# Patient Record
Sex: FEMALE | Race: WHITE
[De-identification: names, ages, dates, MRNs, and addresses within clinical notes are randomized per-mention and may not be internally consistent; named-entity substitution may affect disease eponyms.]

---

## 2017-08-18 ENCOUNTER — HOSPITAL ENCOUNTER (OUTPATIENT)
Dept: HOSPITAL 80 - FIMAGING | Age: 52
End: 2017-08-18
Attending: INTERNAL MEDICINE
Payer: COMMERCIAL

## 2017-08-18 ENCOUNTER — HOSPITAL ENCOUNTER (OUTPATIENT)
Dept: HOSPITAL 80 - FIMAGING | Age: 52
End: 2017-08-18
Attending: PHYSICAL MEDICINE & REHABILITATION
Payer: COMMERCIAL

## 2017-08-18 DIAGNOSIS — M51.36: ICD-10-CM

## 2017-08-18 DIAGNOSIS — M43.16: ICD-10-CM

## 2017-08-18 DIAGNOSIS — M54.41: Primary | ICD-10-CM

## 2017-08-18 DIAGNOSIS — Z12.31: Primary | ICD-10-CM

## 2017-08-18 PROCEDURE — G0202 SCR MAMMO BI INCL CAD: HCPCS

## 2017-08-19 ENCOUNTER — HOSPITAL ENCOUNTER (EMERGENCY)
Dept: HOSPITAL 80 - FED | Age: 52
Discharge: HOME | End: 2017-08-19
Payer: COMMERCIAL

## 2017-08-19 VITALS — SYSTOLIC BLOOD PRESSURE: 148 MMHG | RESPIRATION RATE: 16 BRPM | HEART RATE: 78 BPM | DIASTOLIC BLOOD PRESSURE: 88 MMHG

## 2017-08-19 VITALS — OXYGEN SATURATION: 94 % | TEMPERATURE: 98.1 F

## 2017-08-19 DIAGNOSIS — I10: ICD-10-CM

## 2017-08-19 DIAGNOSIS — M54.16: Primary | ICD-10-CM

## 2017-08-19 NOTE — EDPHY
H & P


Time Seen by Provider: 08/19/17 12:50


HPI/ROS: 





CHIEF COMPLAINT:  Numbness in right side of the groin





HISTORY OF PRESENT ILLNESS:  Patient injured her back on August 6 12 dead 

lifting.  She dropped the bar and stopped her cross fit class and went to 

target and then noticed she had burning pain in the right side of her hip and 

down her leg.  She was treated with a Medrol Dosepak which was finished on 

August 15th.  She is currently on gabapentin, both of those prescribed by Dr. Iniguez.  She is also taking meloxicam.  Today she noticed that she had more 

numbness and tingling on the right side of her vagina and groin as well as 

feeling like it took more effort to empty her bladder.


She feels a little bit weak but she feels like the pain and weakness have 

improved since her initial event.


No incontinence of bladder or bowel.  No back pain now.  No fever or chills.





REVIEW OF SYSTEMS:


Eye: no change in vision


ENT: no sore throat


Cardiac: no chest pain or syncope


Pulmonary: no cough or SOB


Abdomen: no vomiting, diarrhea, abdominal pain


Musculoskeletal:  HPI


Skin: no rash


Neuro:  HPI


Constitutional: no fever


:  HPI, no dysuria or hematuria





A comprehensive 10 point review of systems is otherwise negative aside from 

elements mentioned in the history of present illness.





PAST MEDICAL HISTORY:  Includes adrenal hyperplasia on chronic steroids, 

hypertension.





Social history:  Physician, psychiatrist.





General Appearance: Alert and conversant, cooperative.


Eyes: No scleral  icterus. 


ENT, Mouth: Normal mucous membranes.


Respiratory: Normal respiratory effort, breath sounds equal, lungs are clear to 

auscultation.


Cardiovascular:  Regular rate and rhythm.


Gastrointestinal:  Abdomen is soft and non tender.


Neurological: Alert and oriented x3.   Normally conversant.  Toes downgoing 

bilaterally.  Patellar reflexes 2+ on the left and 0-1 on the right.  Ankle 

reflexes 1+ bilaterally. No clonus.  She can squat by the bedside and stand 

without assistance.  Negative straight leg raising bilaterally. She does have 

preserved sensation to light touch in both lower extremities.


Skin: Warm and dry, no rashes.


Musculoskeletal:  No midline spinal tenderness and no sciatic notch tenderness.

  


Psychiatric: Not agitated.





Emergency Department course/MDM:


Plan for MRI to evaluate for potential surgical lesion.  Discussed and 

consented.  Has claustrophobia so 1 mg oral Ativan requested by her.





1550:  Results discussed, nonsurgical, continue current therapy regimen.  

Patient says her strength is improving and her pain is less over the past 

several days.  Will follow up with her physiatrist and PCP, spine referral if 

symptoms worsen.


Smoking Status: Never smoked


Constitutional: 


 Initial Vital Signs











Temperature (C)  36.7 C   08/19/17 11:04


 


Heart Rate  77   08/19/17 11:04


 


Respiratory Rate  14   08/19/17 11:04


 


Blood Pressure  157/78 H  08/19/17 11:04


 


O2 Sat (%)  94   08/19/17 11:04








 











O2 Delivery Mode               Room Air














Allergies/Adverse Reactions: 


 





Penicillins Allergy (Severe, Verified 05/24/10 14:31)


 Hives








Home Medications: 














 Medication  Instructions  Recorded


 


PREDNISONE  11/02/09


 


Spironolactone  11/02/09


 


Aspirin  05/30/10


 


COZAAR  05/30/10


 


Fish Oil  05/30/10


 


Lovenox  05/30/10


 


Multivitamins W-Iron  05/30/10














Medical Decision Making





- Diagnostics


Imaging Results: 


 Imaging Impressions





Lumbar Spine MRI  08/19/17 13:01


Impression:


1. L4-L5: Mild spinal canal narrowing with moderate bilateral neural foraminal 

stenosis with probable compression of the exiting right L4 nerve root.


2. L2-L3: Increased moderate to severe vertebral spondylosis with mild spinal 

canal narrowing and moderate bilateral neural foraminal stenosis. 


3. Please see above findings at specific disk levels.


 


Findings discussed with Dr. Andriy Yip on August 19, 2017 at 1536 hours. 


 














- Data Points


Medications Given: 


 








Discontinued Medications





Lorazepam (Ativan)  1 mg PO EDNOW ONE


   Stop: 08/19/17 13:03


   Last Admin: 08/19/17 13:11 Dose:  1 mg








Departure





- Departure


Disposition: Home, Routine, Self-Care


Clinical Impression: 


 Lumbar radiculopathy, right





Condition: Good


Instructions:  Lumbar Radiculopathy (ED)


Additional Instructions: 


Return for increasing weakness, numbness, or any incontinence or severe pain.


Your MRI shows L4-5 findings consistent with radiculopathy on that side.  

Compression of L4 nerve root.


Referrals: 


Iván Mayers MD [Primary Care Provider] - As per Instructions

## 2018-05-03 ENCOUNTER — HOSPITAL ENCOUNTER (OUTPATIENT)
Dept: HOSPITAL 80 - BMCIMAGING | Age: 53
End: 2018-05-03
Attending: FAMILY MEDICINE
Payer: COMMERCIAL

## 2018-05-03 DIAGNOSIS — Z78.0: ICD-10-CM

## 2018-05-03 DIAGNOSIS — E25.0: ICD-10-CM

## 2018-05-03 DIAGNOSIS — Z79.899: ICD-10-CM

## 2018-05-03 DIAGNOSIS — Z13.820: Primary | ICD-10-CM

## 2018-09-14 ENCOUNTER — HOSPITAL ENCOUNTER (OUTPATIENT)
Dept: HOSPITAL 80 - FIMAGING | Age: 53
End: 2018-09-14
Attending: FAMILY MEDICINE
Payer: COMMERCIAL

## 2018-09-14 DIAGNOSIS — Z12.31: Primary | ICD-10-CM
